# Patient Record
Sex: FEMALE | Race: WHITE | NOT HISPANIC OR LATINO | Employment: FULL TIME | URBAN - NONMETROPOLITAN AREA
[De-identification: names, ages, dates, MRNs, and addresses within clinical notes are randomized per-mention and may not be internally consistent; named-entity substitution may affect disease eponyms.]

---

## 2024-02-23 ENCOUNTER — HOSPITAL ENCOUNTER (OUTPATIENT)
Dept: RADIOLOGY | Facility: HOSPITAL | Age: 44
Discharge: HOME | End: 2024-02-23
Payer: COMMERCIAL

## 2024-02-23 PROCEDURE — 75571 CT HRT W/O DYE W/CA TEST: CPT

## 2024-03-12 ENCOUNTER — HOSPITAL ENCOUNTER (OUTPATIENT)
Dept: CARDIOLOGY | Facility: HOSPITAL | Age: 44
Discharge: HOME | End: 2024-03-12
Payer: COMMERCIAL

## 2024-03-12 DIAGNOSIS — I51.7 HEART ENLARGEMENT: ICD-10-CM

## 2024-03-12 PROCEDURE — 93306 TTE W/DOPPLER COMPLETE: CPT

## 2024-03-12 PROCEDURE — 93306 TTE W/DOPPLER COMPLETE: CPT | Performed by: STUDENT IN AN ORGANIZED HEALTH CARE EDUCATION/TRAINING PROGRAM

## 2024-03-14 LAB
AORTIC VALVE PEAK VELOCITY: 1.44 M/S
AV PEAK GRADIENT: 8.3 MMHG
AVA (PEAK VEL): 2.36 CM2
EJECTION FRACTION APICAL 4 CHAMBER: 63.6
EJECTION FRACTION: 63 %
LEFT ATRIUM VOLUME AREA LENGTH INDEX BSA: 22.2 ML/M2
LEFT VENTRICLE INTERNAL DIMENSION DIASTOLE: 4.84 CM (ref 3.5–6)
LEFT VENTRICULAR OUTFLOW TRACT DIAMETER: 1.9 CM
MITRAL VALVE E/A RATIO: 1.16
MITRAL VALVE E/E' RATIO: 6.37
RIGHT VENTRICLE FREE WALL PEAK S': 14.5 CM/S
RIGHT VENTRICLE PEAK SYSTOLIC PRESSURE: 27 MMHG
TRICUSPID ANNULAR PLANE SYSTOLIC EXCURSION: 2 CM

## 2024-06-06 ENCOUNTER — APPOINTMENT (OUTPATIENT)
Dept: RADIOLOGY | Facility: HOSPITAL | Age: 44
End: 2024-06-06
Payer: COMMERCIAL

## 2024-06-10 ENCOUNTER — HOSPITAL ENCOUNTER (OUTPATIENT)
Dept: RADIOLOGY | Facility: HOSPITAL | Age: 44
Discharge: HOME | End: 2024-06-10
Payer: COMMERCIAL

## 2024-06-10 DIAGNOSIS — R59.9 ENLARGED LYMPH NODES, UNSPECIFIED: ICD-10-CM

## 2024-06-10 PROCEDURE — 71260 CT THORAX DX C+: CPT

## 2024-06-10 PROCEDURE — 2550000001 HC RX 255 CONTRASTS: Performed by: FAMILY MEDICINE

## 2024-06-10 PROCEDURE — 71260 CT THORAX DX C+: CPT | Performed by: RADIOLOGY

## 2024-06-10 RX ADMIN — IOHEXOL 50 ML: 350 INJECTION, SOLUTION INTRAVENOUS at 08:07

## 2024-07-08 ENCOUNTER — HOSPITAL ENCOUNTER (OUTPATIENT)
Dept: RADIOLOGY | Facility: HOSPITAL | Age: 44
Discharge: HOME | End: 2024-07-08
Payer: COMMERCIAL

## 2024-07-08 DIAGNOSIS — M89.8X9 OTHER SPECIFIED DISORDERS OF BONE, UNSPECIFIED SITE: ICD-10-CM

## 2024-07-08 PROCEDURE — A9503 TC99M MEDRONATE: HCPCS | Performed by: FAMILY MEDICINE

## 2024-07-08 PROCEDURE — 78306 BONE IMAGING WHOLE BODY: CPT

## 2024-07-08 PROCEDURE — 78306 BONE IMAGING WHOLE BODY: CPT | Performed by: STUDENT IN AN ORGANIZED HEALTH CARE EDUCATION/TRAINING PROGRAM

## 2024-07-08 PROCEDURE — 3430000001 HC RX 343 DIAGNOSTIC RADIOPHARMACEUTICALS: Performed by: FAMILY MEDICINE

## 2024-07-19 ENCOUNTER — HOSPITAL ENCOUNTER (OUTPATIENT)
Dept: RADIOLOGY | Facility: HOSPITAL | Age: 44
Discharge: HOME | End: 2024-07-19
Payer: COMMERCIAL

## 2024-07-19 VITALS — WEIGHT: 170 LBS | HEIGHT: 64 IN | BODY MASS INDEX: 29.02 KG/M2

## 2024-07-19 DIAGNOSIS — Z12.31 SCREENING MAMMOGRAM FOR BREAST CANCER: ICD-10-CM

## 2024-07-19 PROCEDURE — 77063 BREAST TOMOSYNTHESIS BI: CPT

## 2024-08-12 ENCOUNTER — APPOINTMENT (OUTPATIENT)
Dept: DERMATOLOGY | Facility: CLINIC | Age: 44
End: 2024-08-12
Payer: COMMERCIAL

## 2024-08-12 DIAGNOSIS — L82.1 SEBORRHEIC KERATOSIS: ICD-10-CM

## 2024-08-12 DIAGNOSIS — D22.9 MELANOCYTIC NEVUS, UNSPECIFIED LOCATION: ICD-10-CM

## 2024-08-12 DIAGNOSIS — L81.4 LENTIGO: ICD-10-CM

## 2024-08-12 DIAGNOSIS — D18.01 HEMANGIOMA OF SKIN: ICD-10-CM

## 2024-08-12 DIAGNOSIS — Z12.83 ENCOUNTER FOR SCREENING FOR MALIGNANT NEOPLASM OF SKIN: Primary | ICD-10-CM

## 2024-08-12 DIAGNOSIS — B36.0 TINEA VERSICOLOR: ICD-10-CM

## 2024-08-12 PROCEDURE — 99214 OFFICE O/P EST MOD 30 MIN: CPT | Performed by: NURSE PRACTITIONER

## 2024-08-12 RX ORDER — BENZONATATE 200 MG/1
CAPSULE ORAL EVERY 8 HOURS
COMMUNITY

## 2024-08-12 RX ORDER — KETOCONAZOLE 20 MG/G
CREAM TOPICAL
Qty: 60 G | Refills: 6 | Status: SHIPPED | OUTPATIENT
Start: 2024-08-12

## 2024-08-12 RX ORDER — BUSPIRONE HYDROCHLORIDE 10 MG/1
10 TABLET ORAL EVERY 8 HOURS PRN
COMMUNITY
Start: 2024-02-29

## 2024-08-12 RX ORDER — NAPROXEN 500 MG/1
TABLET ORAL EVERY 12 HOURS
COMMUNITY
Start: 2019-11-01

## 2024-08-12 RX ORDER — ALBUTEROL SULFATE 90 UG/1
2 INHALANT RESPIRATORY (INHALATION) EVERY 4 HOURS PRN
COMMUNITY
Start: 2023-11-07

## 2024-08-12 RX ORDER — ONDANSETRON HYDROCHLORIDE 8 MG/1
TABLET, FILM COATED ORAL
COMMUNITY
Start: 2024-06-18

## 2024-08-12 RX ORDER — ALUMINUM CHLORIDE 20 %
1 SOLUTION, NON-ORAL TOPICAL
COMMUNITY
Start: 2017-02-14

## 2024-08-12 RX ORDER — NORETHINDRONE 0.35 MG/1
TABLET ORAL
COMMUNITY
Start: 2018-01-15

## 2024-08-12 RX ORDER — LACTULOSE 10 G/15ML
20 SOLUTION ORAL 3 TIMES DAILY
COMMUNITY
Start: 2023-10-18

## 2024-08-12 RX ORDER — DEXAMETHASONE 6 MG/1
TABLET ORAL
COMMUNITY
Start: 2023-11-07

## 2024-08-12 NOTE — PROGRESS NOTES
Subjective   Adri Mark is a 44 y.o. female who presents for the following: Skin Check.  Skin Cancer Screening  She has a history of moderate sun exposure. She is in the sun occasionally. She uses sunscreen occasionally. She reports no skin symptoms. Her moles are not changing.    Spots that concern her: concern to right breast, asymptomatic.         Objective   Well appearing patient in no apparent distress; mood and affect are within normal limits.    A full examination was performed including scalp, head, eyes, ears, nose, lips, neck, chest, axillae, abdomen, back, buttocks, bilateral upper extremities, bilateral lower extremities, hands, feet, fingers, toes, fingernails, and toenails. All findings within normal limits unless otherwise noted below.    Scattered benign lesions    Uniform pigmented macule(s)/papule(s) with reassuring findings on dermoscopy    Stuck on verrucous, tan-brown papules and plaques.      Violaceous/red papule with maroon lagoons     Scattered tan macules in sun-exposed areas.    Left Upper Back, Pubic  Multiple hyperpigmented or hypopigmented macules and patches or thin papules / plaques with fine bran-like scale.       Assessment/Plan   Encounter for screening for malignant neoplasm of skin    - Protective measures, such as avoiding skin exposure to sunlight during peak sun hours (10 AM to 3 PM), wearing protective clothing, and applying high-SPF sunscreen, are essential for reducing exposure to harmful ultraviolet (UV) light.  - Monthly self-examination of the skin is helpful to detect new lesions or changes in existing lesions.  - Discussed signs and symptoms of sun-related skin cancers.   - Make sure your moles are not signs of skin cancer (melanoma). Remember the ABCDEs of melanoma lesions:  A - Asymmetry: One half of the lesion does not mirror the other half.  B - Border: The borders are irregular or vague (indistinct).  C - Color: More than one color may be noted within the  mole.  D - Diameter: Size greater than 6 mm (roughly the size of a pencil eraser) may be concerning.  E - Evolving: Notable changes in the lesion over time are suspicious signs for skin cancer.    Melanocytic nevus, unspecified location    -Discussed nature of condition  -Reassurance, benign-appearing features on examination today  -Recommend continued observation    Seborrheic keratosis    Although Seborrheic Keratoses can be troublesome and unsightly, they are entirely benign.  Removal of Seborrheic Keratoses is considered a cosmetic procedure. Removal is typically performed using liquid nitrogen cryotherapy.  Treatment of current lesions does not prevent the development of new Seborrheic Keratoses in the future.    Hemangioma of skin    - A cherry hemangioma is a small macule (small, flat, smooth area) or papule (small, solid bump) formed from an overgrowth of tiny blood vessels in the skin. Cherry hemangiomas are characteristically red or purplish in color. They often first appear in middle adulthood and usually increase in number with age. Cherry hemangiomas are noncancerous (benign) and are common in adults.  - Lesions are benign, reassured patient.     Lentigo    A solar lentigo (plural, solar lentigines), sometimes called an age spot or liver spot, is a brown macule (small, flat, smooth area of skin) caused by chronic sun or artificial ultraviolet (UV) light exposure. There may be just one lentigo or there may be multiple. This type of lentigo is different from lentigo simplex (discussed separately) because it is caused by exposure to UV light. Solar lentigines are benign, but they do indicate excessive sun exposure, a risk factor for the development of skin cancer.  Lesions are benign, no treatment needed.     Tinea versicolor  Pubic; Left Upper Back    Tinea versicolor, also known as pityriasis versicolor, is a common skin condition caused by a surface (superficial) infection with a yeast called Malassezia  that commonly lives on the skin. Under certain conditions, such as warm, oily, and moist skin, the yeast can overgrow and cause a rash that may appear as tan, pink, brown, or white patches (flat areas that are larger than a thumbnail) with a layer of fine scale. In some people, the patches are darker than their usual skin color (hyperpigmented). In others, the patches may be lighter (hypopigmented). Sometimes the patches may be both lighter and darker in different areas. In darker skin colors, the rash is most often lighter than the surrounding skin. Although it is an infection, tinea versicolor is not contagious.  Start ketoconazole cream, use as directed.   - Risks, benefits, and side effects discussed. Patient understood and agrees with the plan.       Related Medications  ketoconazole (NIZOral) 2 % cream  Daily to affected areas until controlled, then 1-2 times weekly

## 2024-09-09 ENCOUNTER — APPOINTMENT (OUTPATIENT)
Dept: DERMATOLOGY | Facility: CLINIC | Age: 44
End: 2024-09-09
Payer: COMMERCIAL

## 2024-12-12 ENCOUNTER — HOSPITAL ENCOUNTER (OUTPATIENT)
Dept: RADIOLOGY | Facility: HOSPITAL | Age: 44
Discharge: HOME | End: 2024-12-12
Payer: COMMERCIAL

## 2024-12-12 DIAGNOSIS — R91.8 OTHER NONSPECIFIC ABNORMAL FINDING OF LUNG FIELD: ICD-10-CM

## 2024-12-12 PROCEDURE — 71250 CT THORAX DX C-: CPT | Performed by: RADIOLOGY

## 2024-12-12 PROCEDURE — 71250 CT THORAX DX C-: CPT

## 2025-08-26 ENCOUNTER — APPOINTMENT (OUTPATIENT)
Dept: OTOLARYNGOLOGY | Facility: CLINIC | Age: 45
End: 2025-08-26
Payer: COMMERCIAL

## 2025-08-26 ENCOUNTER — HOSPITAL ENCOUNTER (OUTPATIENT)
Dept: RADIOLOGY | Facility: HOSPITAL | Age: 45
Discharge: HOME | End: 2025-08-26
Payer: COMMERCIAL

## 2025-08-26 VITALS — BODY MASS INDEX: 30.73 KG/M2 | WEIGHT: 180 LBS | HEIGHT: 64 IN

## 2025-08-26 VITALS — TEMPERATURE: 96.8 F | HEIGHT: 64 IN | BODY MASS INDEX: 31.58 KG/M2 | WEIGHT: 185 LBS

## 2025-08-26 DIAGNOSIS — R09.A2 GLOBUS PHARYNGEUS: ICD-10-CM

## 2025-08-26 DIAGNOSIS — R09.82 POSTNASAL DRIP: ICD-10-CM

## 2025-08-26 DIAGNOSIS — Z12.31 ENCOUNTER FOR SCREENING MAMMOGRAM FOR MALIGNANT NEOPLASM OF BREAST: ICD-10-CM

## 2025-08-26 DIAGNOSIS — J31.0 CHRONIC RHINITIS: Primary | ICD-10-CM

## 2025-08-26 DIAGNOSIS — K21.9 LARYNGOPHARYNGEAL REFLUX: ICD-10-CM

## 2025-08-26 PROCEDURE — 99203 OFFICE O/P NEW LOW 30 MIN: CPT | Performed by: OTOLARYNGOLOGY

## 2025-08-26 PROCEDURE — 3008F BODY MASS INDEX DOCD: CPT | Performed by: OTOLARYNGOLOGY

## 2025-08-26 PROCEDURE — 77067 SCR MAMMO BI INCL CAD: CPT | Performed by: RADIOLOGY

## 2025-08-26 PROCEDURE — 77063 BREAST TOMOSYNTHESIS BI: CPT | Performed by: RADIOLOGY

## 2025-08-26 PROCEDURE — 31575 DIAGNOSTIC LARYNGOSCOPY: CPT | Performed by: OTOLARYNGOLOGY

## 2025-08-26 PROCEDURE — 77067 SCR MAMMO BI INCL CAD: CPT

## 2025-08-26 RX ORDER — AZELASTINE 1 MG/ML
SPRAY, METERED NASAL
Qty: 30 ML | Refills: 11 | Status: SHIPPED | OUTPATIENT
Start: 2025-08-26

## 2025-08-26 RX ORDER — FLUTICASONE PROPIONATE 50 MCG
SPRAY, SUSPENSION (ML) NASAL
Qty: 16 G | Refills: 11 | Status: SHIPPED | OUTPATIENT
Start: 2025-08-26

## 2025-09-08 ENCOUNTER — APPOINTMENT (OUTPATIENT)
Dept: DERMATOLOGY | Facility: CLINIC | Age: 45
End: 2025-09-08
Payer: COMMERCIAL

## 2026-02-09 ENCOUNTER — APPOINTMENT (OUTPATIENT)
Dept: DERMATOLOGY | Facility: CLINIC | Age: 46
End: 2026-02-09
Payer: COMMERCIAL